# Patient Record
Sex: FEMALE | Race: BLACK OR AFRICAN AMERICAN | HISPANIC OR LATINO | URBAN - METROPOLITAN AREA
[De-identification: names, ages, dates, MRNs, and addresses within clinical notes are randomized per-mention and may not be internally consistent; named-entity substitution may affect disease eponyms.]

---

## 2024-03-24 ENCOUNTER — HOSPITAL ENCOUNTER (INPATIENT)
Facility: HOSPITAL | Age: 39
LOS: 2 days | Discharge: HOME | End: 2024-03-26
Attending: STUDENT IN AN ORGANIZED HEALTH CARE EDUCATION/TRAINING PROGRAM | Admitting: OBSTETRICS & GYNECOLOGY

## 2024-03-24 DIAGNOSIS — D62 ACUTE BLOOD LOSS ANEMIA: ICD-10-CM

## 2024-03-24 DIAGNOSIS — Z30.017 NEXPLANON INSERTION: ICD-10-CM

## 2024-03-24 DIAGNOSIS — E05.90 HYPERTHYROIDISM: ICD-10-CM

## 2024-03-24 LAB
ABO GROUP (TYPE) IN BLOOD: NORMAL
ALBUMIN SERPL BCP-MCNC: 3 G/DL (ref 3.4–5)
ALP SERPL-CCNC: 184 U/L (ref 33–110)
ALT SERPL W P-5'-P-CCNC: 6 U/L (ref 7–45)
ANION GAP BLDV CALCULATED.4IONS-SCNC: 12 MMOL/L (ref 10–25)
ANION GAP SERPL CALC-SCNC: 12 MMOL/L (ref 10–20)
ANTIBODY SCREEN: NORMAL
APTT PPP: 26 SECONDS (ref 27–38)
AST SERPL W P-5'-P-CCNC: 12 U/L (ref 9–39)
B2 GLYCOPROT1 IGA SER-ACNC: <0.6 U/ML
B2 GLYCOPROT1 IGG SER-ACNC: <1.4 U/ML
B2 GLYCOPROT1 IGM SER-ACNC: 0.2 U/ML
BASE EXCESS BLDV CALC-SCNC: -4.8 MMOL/L (ref -2–3)
BASOPHILS # BLD AUTO: 0.05 X10*3/UL (ref 0–0.1)
BASOPHILS NFR BLD AUTO: 0.2 %
BILIRUB SERPL-MCNC: 0.3 MG/DL (ref 0–1.2)
BODY TEMPERATURE: 37 DEGREES CELSIUS
BUN SERPL-MCNC: 7 MG/DL (ref 6–23)
CA-I BLDV-SCNC: 1.14 MMOL/L (ref 1.1–1.33)
CALCIUM SERPL-MCNC: 8.2 MG/DL (ref 8.6–10.6)
CARDIOLIPIN IGA SERPL-ACNC: 0.6 APL U/ML
CARDIOLIPIN IGG SER IA-ACNC: <1.6 GPL U/ML
CARDIOLIPIN IGM SER IA-ACNC: <0.2 MPL U/ML
CHLORIDE BLDV-SCNC: 104 MMOL/L (ref 98–107)
CHLORIDE SERPL-SCNC: 106 MMOL/L (ref 98–107)
CO2 SERPL-SCNC: 19 MMOL/L (ref 21–32)
CREAT SERPL-MCNC: 0.36 MG/DL (ref 0.5–1.05)
EGFRCR SERPLBLD CKD-EPI 2021: >90 ML/MIN/1.73M*2
EOSINOPHIL # BLD AUTO: 0.01 X10*3/UL (ref 0–0.7)
EOSINOPHIL NFR BLD AUTO: 0 %
ERYTHROCYTE [DISTWIDTH] IN BLOOD BY AUTOMATED COUNT: 13.6 % (ref 11.5–14.5)
EST. AVERAGE GLUCOSE BLD GHB EST-MCNC: 88 MG/DL
FIBRINOGEN PPP-MCNC: 431 MG/DL (ref 200–400)
GLUCOSE BLDV-MCNC: 116 MG/DL (ref 74–99)
GLUCOSE SERPL-MCNC: 115 MG/DL (ref 74–99)
HBA1C MFR BLD: 4.7 %
HBV SURFACE AB SER-ACNC: 204.1 MIU/ML
HBV SURFACE AG SERPL QL IA: NONREACTIVE
HCO3 BLDV-SCNC: 19.5 MMOL/L (ref 22–26)
HCT VFR BLD AUTO: 31.2 % (ref 36–46)
HCT VFR BLD EST: 35 % (ref 36–46)
HCV AB SER QL: NONREACTIVE
HGB BLD-MCNC: 11.3 G/DL (ref 12–16)
HGB BLDV-MCNC: 11.6 G/DL (ref 12–16)
HIV 1+2 AB+HIV1 P24 AG SERPL QL IA: NONREACTIVE
IMM GRANULOCYTES # BLD AUTO: 0.19 X10*3/UL (ref 0–0.7)
IMM GRANULOCYTES NFR BLD AUTO: 0.8 % (ref 0–0.9)
INR PPP: 1.1 (ref 0.9–1.1)
LACTATE BLDV-SCNC: 1.7 MMOL/L (ref 0.4–2)
LYMPHOCYTES # BLD AUTO: 0.94 X10*3/UL (ref 1.2–4.8)
LYMPHOCYTES NFR BLD AUTO: 3.8 %
MCH RBC QN AUTO: 29 PG (ref 26–34)
MCHC RBC AUTO-ENTMCNC: 36.2 G/DL (ref 32–36)
MCV RBC AUTO: 80 FL (ref 80–100)
MONOCYTES # BLD AUTO: 1.57 X10*3/UL (ref 0.1–1)
MONOCYTES NFR BLD AUTO: 6.4 %
NEUTROPHILS # BLD AUTO: 21.92 X10*3/UL (ref 1.2–7.7)
NEUTROPHILS NFR BLD AUTO: 88.8 %
NRBC BLD-RTO: 0 /100 WBCS (ref 0–0)
OXYHGB MFR BLDV: 44.2 % (ref 45–75)
PCO2 BLDV: 33 MM HG (ref 41–51)
PH BLDV: 7.38 PH (ref 7.33–7.43)
PLATELET # BLD AUTO: 214 X10*3/UL (ref 150–450)
PO2 BLDV: 29 MM HG (ref 35–45)
POTASSIUM BLDV-SCNC: 3.5 MMOL/L (ref 3.5–5.3)
POTASSIUM SERPL-SCNC: 3.4 MMOL/L (ref 3.5–5.3)
PROT SERPL-MCNC: 6.6 G/DL (ref 6.4–8.2)
PROTHROMBIN TIME: 12.1 SECONDS (ref 9.8–12.8)
RBC # BLD AUTO: 3.9 X10*6/UL (ref 4–5.2)
RH FACTOR (ANTIGEN D): NORMAL
RUBV IGG SERPL IA-ACNC: 1.4 IA
RUBV IGG SERPL QL IA: POSITIVE
SAO2 % BLDV: 45 % (ref 45–75)
SODIUM BLDV-SCNC: 132 MMOL/L (ref 136–145)
SODIUM SERPL-SCNC: 134 MMOL/L (ref 136–145)
T4 FREE SERPL-MCNC: 2.45 NG/DL (ref 0.78–1.48)
TREPONEMA PALLIDUM IGG+IGM AB [PRESENCE] IN SERUM OR PLASMA BY IMMUNOASSAY: NONREACTIVE
TSH SERPL-ACNC: <0.01 MIU/L (ref 0.44–3.98)
WBC # BLD AUTO: 24.7 X10*3/UL (ref 4.4–11.3)

## 2024-03-24 PROCEDURE — 2500000004 HC RX 250 GENERAL PHARMACY W/ HCPCS (ALT 636 FOR OP/ED): Performed by: STUDENT IN AN ORGANIZED HEALTH CARE EDUCATION/TRAINING PROGRAM

## 2024-03-24 PROCEDURE — 36415 COLL VENOUS BLD VENIPUNCTURE: CPT | Performed by: STUDENT IN AN ORGANIZED HEALTH CARE EDUCATION/TRAINING PROGRAM

## 2024-03-24 PROCEDURE — 99291 CRITICAL CARE FIRST HOUR: CPT | Performed by: STUDENT IN AN ORGANIZED HEALTH CARE EDUCATION/TRAINING PROGRAM

## 2024-03-24 PROCEDURE — 85613 RUSSELL VIPER VENOM DILUTED: CPT | Performed by: STUDENT IN AN ORGANIZED HEALTH CARE EDUCATION/TRAINING PROGRAM

## 2024-03-24 PROCEDURE — 88307 TISSUE EXAM BY PATHOLOGIST: CPT | Mod: TC,SUR | Performed by: STUDENT IN AN ORGANIZED HEALTH CARE EDUCATION/TRAINING PROGRAM

## 2024-03-24 PROCEDURE — 85025 COMPLETE CBC W/AUTO DIFF WBC: CPT | Performed by: STUDENT IN AN ORGANIZED HEALTH CARE EDUCATION/TRAINING PROGRAM

## 2024-03-24 PROCEDURE — 86901 BLOOD TYPING SEROLOGIC RH(D): CPT | Performed by: STUDENT IN AN ORGANIZED HEALTH CARE EDUCATION/TRAINING PROGRAM

## 2024-03-24 PROCEDURE — 96374 THER/PROPH/DIAG INJ IV PUSH: CPT

## 2024-03-24 PROCEDURE — 84439 ASSAY OF FREE THYROXINE: CPT | Performed by: STUDENT IN AN ORGANIZED HEALTH CARE EDUCATION/TRAINING PROGRAM

## 2024-03-24 PROCEDURE — 86803 HEPATITIS C AB TEST: CPT | Performed by: STUDENT IN AN ORGANIZED HEALTH CARE EDUCATION/TRAINING PROGRAM

## 2024-03-24 PROCEDURE — 87340 HEPATITIS B SURFACE AG IA: CPT | Performed by: STUDENT IN AN ORGANIZED HEALTH CARE EDUCATION/TRAINING PROGRAM

## 2024-03-24 PROCEDURE — 87389 HIV-1 AG W/HIV-1&-2 AB AG IA: CPT | Performed by: STUDENT IN AN ORGANIZED HEALTH CARE EDUCATION/TRAINING PROGRAM

## 2024-03-24 PROCEDURE — 85610 PROTHROMBIN TIME: CPT | Performed by: STUDENT IN AN ORGANIZED HEALTH CARE EDUCATION/TRAINING PROGRAM

## 2024-03-24 PROCEDURE — 86706 HEP B SURFACE ANTIBODY: CPT | Performed by: STUDENT IN AN ORGANIZED HEALTH CARE EDUCATION/TRAINING PROGRAM

## 2024-03-24 PROCEDURE — 83020 HEMOGLOBIN ELECTROPHORESIS: CPT | Performed by: STUDENT IN AN ORGANIZED HEALTH CARE EDUCATION/TRAINING PROGRAM

## 2024-03-24 PROCEDURE — 96372 THER/PROPH/DIAG INJ SC/IM: CPT | Performed by: STUDENT IN AN ORGANIZED HEALTH CARE EDUCATION/TRAINING PROGRAM

## 2024-03-24 PROCEDURE — 83036 HEMOGLOBIN GLYCOSYLATED A1C: CPT | Performed by: STUDENT IN AN ORGANIZED HEALTH CARE EDUCATION/TRAINING PROGRAM

## 2024-03-24 PROCEDURE — 88307 TISSUE EXAM BY PATHOLOGIST: CPT | Performed by: PATHOLOGY

## 2024-03-24 PROCEDURE — 85384 FIBRINOGEN ACTIVITY: CPT | Performed by: STUDENT IN AN ORGANIZED HEALTH CARE EDUCATION/TRAINING PROGRAM

## 2024-03-24 PROCEDURE — 86780 TREPONEMA PALLIDUM: CPT | Performed by: STUDENT IN AN ORGANIZED HEALTH CARE EDUCATION/TRAINING PROGRAM

## 2024-03-24 PROCEDURE — 1220000001 HC OB SEMI-PRIVATE ROOM DAILY

## 2024-03-24 PROCEDURE — 80053 COMPREHEN METABOLIC PANEL: CPT

## 2024-03-24 PROCEDURE — 84132 ASSAY OF SERUM POTASSIUM: CPT

## 2024-03-24 PROCEDURE — 86146 BETA-2 GLYCOPROTEIN ANTIBODY: CPT | Performed by: STUDENT IN AN ORGANIZED HEALTH CARE EDUCATION/TRAINING PROGRAM

## 2024-03-24 PROCEDURE — 83021 HEMOGLOBIN CHROMOTOGRAPHY: CPT | Performed by: STUDENT IN AN ORGANIZED HEALTH CARE EDUCATION/TRAINING PROGRAM

## 2024-03-24 PROCEDURE — 84443 ASSAY THYROID STIM HORMONE: CPT | Performed by: STUDENT IN AN ORGANIZED HEALTH CARE EDUCATION/TRAINING PROGRAM

## 2024-03-24 PROCEDURE — 86317 IMMUNOASSAY INFECTIOUS AGENT: CPT | Performed by: STUDENT IN AN ORGANIZED HEALTH CARE EDUCATION/TRAINING PROGRAM

## 2024-03-24 PROCEDURE — 84132 ASSAY OF SERUM POTASSIUM: CPT | Performed by: STUDENT IN AN ORGANIZED HEALTH CARE EDUCATION/TRAINING PROGRAM

## 2024-03-24 PROCEDURE — 2500000001 HC RX 250 WO HCPCS SELF ADMINISTERED DRUGS (ALT 637 FOR MEDICARE OP): Performed by: STUDENT IN AN ORGANIZED HEALTH CARE EDUCATION/TRAINING PROGRAM

## 2024-03-24 PROCEDURE — 96372 THER/PROPH/DIAG INJ SC/IM: CPT

## 2024-03-24 PROCEDURE — 7100000016 HC LABOR RECOVERY PER HOUR

## 2024-03-24 PROCEDURE — 59414 DELIVER PLACENTA: CPT | Performed by: STUDENT IN AN ORGANIZED HEALTH CARE EDUCATION/TRAINING PROGRAM

## 2024-03-24 PROCEDURE — 86147 CARDIOLIPIN ANTIBODY EA IG: CPT | Performed by: STUDENT IN AN ORGANIZED HEALTH CARE EDUCATION/TRAINING PROGRAM

## 2024-03-24 RX ORDER — ACETAMINOPHEN 325 MG/1
975 TABLET ORAL EVERY 6 HOURS
Status: DISCONTINUED | OUTPATIENT
Start: 2024-03-24 | End: 2024-03-26 | Stop reason: HOSPADM

## 2024-03-24 RX ORDER — ONDANSETRON HYDROCHLORIDE 2 MG/ML
4 INJECTION, SOLUTION INTRAVENOUS EVERY 6 HOURS PRN
Status: DISCONTINUED | OUTPATIENT
Start: 2024-03-24 | End: 2024-03-26 | Stop reason: HOSPADM

## 2024-03-24 RX ORDER — OXYTOCIN 10 [USP'U]/ML
10 INJECTION, SOLUTION INTRAMUSCULAR; INTRAVENOUS ONCE
Status: COMPLETED | OUTPATIENT
Start: 2024-03-24 | End: 2024-03-24

## 2024-03-24 RX ORDER — TRANEXAMIC ACID 100 MG/ML
1000 INJECTION, SOLUTION INTRAVENOUS ONCE AS NEEDED
Status: DISCONTINUED | OUTPATIENT
Start: 2024-03-24 | End: 2024-03-26 | Stop reason: HOSPADM

## 2024-03-24 RX ORDER — DIPHENHYDRAMINE HCL 25 MG
25 CAPSULE ORAL EVERY 6 HOURS PRN
Status: DISCONTINUED | OUTPATIENT
Start: 2024-03-24 | End: 2024-03-26 | Stop reason: HOSPADM

## 2024-03-24 RX ORDER — OXYTOCIN 10 [USP'U]/ML
10 INJECTION, SOLUTION INTRAMUSCULAR; INTRAVENOUS ONCE AS NEEDED
Status: DISCONTINUED | OUTPATIENT
Start: 2024-03-24 | End: 2024-03-26 | Stop reason: HOSPADM

## 2024-03-24 RX ORDER — HYDROMORPHONE HYDROCHLORIDE 1 MG/ML
0.5 INJECTION, SOLUTION INTRAMUSCULAR; INTRAVENOUS; SUBCUTANEOUS ONCE
Status: DISCONTINUED | OUTPATIENT
Start: 2024-03-24 | End: 2024-03-24

## 2024-03-24 RX ORDER — ONDANSETRON 4 MG/1
4 TABLET, FILM COATED ORAL EVERY 6 HOURS PRN
Status: DISCONTINUED | OUTPATIENT
Start: 2024-03-24 | End: 2024-03-26 | Stop reason: HOSPADM

## 2024-03-24 RX ORDER — SIMETHICONE 80 MG
80 TABLET,CHEWABLE ORAL 4 TIMES DAILY PRN
Status: DISCONTINUED | OUTPATIENT
Start: 2024-03-24 | End: 2024-03-26 | Stop reason: HOSPADM

## 2024-03-24 RX ORDER — ADHESIVE BANDAGE
10 BANDAGE TOPICAL
Status: DISCONTINUED | OUTPATIENT
Start: 2024-03-24 | End: 2024-03-26 | Stop reason: HOSPADM

## 2024-03-24 RX ORDER — IBUPROFEN 600 MG/1
600 TABLET ORAL EVERY 6 HOURS
Status: DISCONTINUED | OUTPATIENT
Start: 2024-03-24 | End: 2024-03-26 | Stop reason: HOSPADM

## 2024-03-24 RX ORDER — MISOPROSTOL 200 UG/1
800 TABLET ORAL ONCE AS NEEDED
Status: DISCONTINUED | OUTPATIENT
Start: 2024-03-24 | End: 2024-03-26 | Stop reason: HOSPADM

## 2024-03-24 RX ORDER — NIFEDIPINE 10 MG/1
10 CAPSULE ORAL ONCE AS NEEDED
Status: DISCONTINUED | OUTPATIENT
Start: 2024-03-24 | End: 2024-03-26 | Stop reason: HOSPADM

## 2024-03-24 RX ORDER — KETOROLAC TROMETHAMINE 30 MG/ML
15 INJECTION, SOLUTION INTRAMUSCULAR; INTRAVENOUS EVERY 6 HOURS SCHEDULED
Status: COMPLETED | OUTPATIENT
Start: 2024-03-24 | End: 2024-03-25

## 2024-03-24 RX ORDER — METHIMAZOLE 10 MG/1
10 TABLET ORAL
COMMUNITY
Start: 2020-03-18 | End: 2024-03-26 | Stop reason: HOSPADM

## 2024-03-24 RX ORDER — OXYTOCIN/0.9 % SODIUM CHLORIDE 30/500 ML
60 PLASTIC BAG, INJECTION (ML) INTRAVENOUS ONCE AS NEEDED
Status: DISCONTINUED | OUTPATIENT
Start: 2024-03-24 | End: 2024-03-26 | Stop reason: HOSPADM

## 2024-03-24 RX ORDER — CARBOPROST TROMETHAMINE 250 UG/ML
250 INJECTION, SOLUTION INTRAMUSCULAR ONCE AS NEEDED
Status: DISCONTINUED | OUTPATIENT
Start: 2024-03-24 | End: 2024-03-26 | Stop reason: HOSPADM

## 2024-03-24 RX ORDER — DIPHENHYDRAMINE HYDROCHLORIDE 50 MG/ML
25 INJECTION INTRAMUSCULAR; INTRAVENOUS EVERY 6 HOURS PRN
Status: DISCONTINUED | OUTPATIENT
Start: 2024-03-24 | End: 2024-03-26 | Stop reason: HOSPADM

## 2024-03-24 RX ORDER — METHYLERGONOVINE MALEATE 0.2 MG/ML
0.2 INJECTION INTRAVENOUS ONCE AS NEEDED
Status: DISCONTINUED | OUTPATIENT
Start: 2024-03-24 | End: 2024-03-26 | Stop reason: HOSPADM

## 2024-03-24 RX ORDER — LOPERAMIDE HYDROCHLORIDE 2 MG/1
4 CAPSULE ORAL EVERY 2 HOUR PRN
Status: DISCONTINUED | OUTPATIENT
Start: 2024-03-24 | End: 2024-03-26 | Stop reason: HOSPADM

## 2024-03-24 RX ORDER — OXYCODONE HYDROCHLORIDE 5 MG/1
5 TABLET ORAL ONCE
Status: COMPLETED | OUTPATIENT
Start: 2024-03-24 | End: 2024-03-24

## 2024-03-24 RX ORDER — GENTAMICIN 40 MG/ML
INJECTION, SOLUTION INTRAMUSCULAR; INTRAVENOUS
Status: DISPENSED
Start: 2024-03-24 | End: 2024-03-24

## 2024-03-24 RX ORDER — POLYETHYLENE GLYCOL 3350 17 G/17G
17 POWDER, FOR SOLUTION ORAL 2 TIMES DAILY PRN
Status: DISCONTINUED | OUTPATIENT
Start: 2024-03-24 | End: 2024-03-26 | Stop reason: HOSPADM

## 2024-03-24 RX ORDER — LIDOCAINE 560 MG/1
1 PATCH PERCUTANEOUS; TOPICAL; TRANSDERMAL
Status: DISCONTINUED | OUTPATIENT
Start: 2024-03-24 | End: 2024-03-26 | Stop reason: HOSPADM

## 2024-03-24 RX ORDER — HYDRALAZINE HYDROCHLORIDE 20 MG/ML
5 INJECTION INTRAMUSCULAR; INTRAVENOUS ONCE AS NEEDED
Status: DISCONTINUED | OUTPATIENT
Start: 2024-03-24 | End: 2024-03-26 | Stop reason: HOSPADM

## 2024-03-24 RX ORDER — LABETALOL HYDROCHLORIDE 5 MG/ML
20 INJECTION, SOLUTION INTRAVENOUS ONCE AS NEEDED
Status: DISCONTINUED | OUTPATIENT
Start: 2024-03-24 | End: 2024-03-26 | Stop reason: HOSPADM

## 2024-03-24 RX ORDER — BISACODYL 10 MG/1
10 SUPPOSITORY RECTAL DAILY PRN
Status: DISCONTINUED | OUTPATIENT
Start: 2024-03-24 | End: 2024-03-26 | Stop reason: HOSPADM

## 2024-03-24 RX ADMIN — AMPICILLIN SODIUM 2 G: 2 INJECTION, POWDER, FOR SOLUTION INTRAMUSCULAR; INTRAVENOUS at 10:24

## 2024-03-24 RX ADMIN — ACETAMINOPHEN 975 MG: 325 TABLET ORAL at 22:53

## 2024-03-24 RX ADMIN — GENTAMICIN SULFATE 400 MG: 40 INJECTION, SOLUTION INTRAMUSCULAR; INTRAVENOUS at 11:05

## 2024-03-24 RX ADMIN — AMPICILLIN SODIUM 2 G: 2 INJECTION, POWDER, FOR SOLUTION INTRAMUSCULAR; INTRAVENOUS at 22:53

## 2024-03-24 RX ADMIN — ACETAMINOPHEN 975 MG: 325 TABLET ORAL at 16:03

## 2024-03-24 RX ADMIN — OXYCODONE HYDROCHLORIDE 5 MG: 5 TABLET ORAL at 12:44

## 2024-03-24 RX ADMIN — KETOROLAC TROMETHAMINE 15 MG: 30 INJECTION, SOLUTION INTRAMUSCULAR; INTRAVENOUS at 22:53

## 2024-03-24 RX ADMIN — KETOROLAC TROMETHAMINE 15 MG: 30 INJECTION, SOLUTION INTRAMUSCULAR; INTRAVENOUS at 16:03

## 2024-03-24 RX ADMIN — AMPICILLIN SODIUM 2 G: 2 INJECTION, POWDER, FOR SOLUTION INTRAMUSCULAR; INTRAVENOUS at 16:03

## 2024-03-24 RX ADMIN — ACETAMINOPHEN 975 MG: 325 TABLET ORAL at 09:55

## 2024-03-24 RX ADMIN — OXYTOCIN 10 UNITS: 10 INJECTION INTRAVENOUS at 07:48

## 2024-03-24 RX ADMIN — IBUPROFEN 600 MG: 600 TABLET, FILM COATED ORAL at 09:55

## 2024-03-24 SDOH — SOCIAL STABILITY: SOCIAL INSECURITY: ABUSE SCREEN: ADULT

## 2024-03-24 SDOH — SOCIAL STABILITY: SOCIAL INSECURITY: DO YOU FEEL ANYONE HAS EXPLOITED OR TAKEN ADVANTAGE OF YOU FINANCIALLY OR OF YOUR PERSONAL PROPERTY?: NO

## 2024-03-24 SDOH — HEALTH STABILITY: MENTAL HEALTH: NON-SPECIFIC ACTIVE SUICIDAL THOUGHTS (PAST 1 MONTH): NO

## 2024-03-24 SDOH — SOCIAL STABILITY: SOCIAL INSECURITY: HAVE YOU HAD THOUGHTS OF HARMING ANYONE ELSE?: NO

## 2024-03-24 SDOH — HEALTH STABILITY: MENTAL HEALTH: HAVE YOU USED ANY SUBSTANCES (CANABIS, COCAINE, HEROIN, HALLUCINOGENS, INHALANTS, ETC.) IN THE PAST 12 MONTHS?: NO

## 2024-03-24 SDOH — SOCIAL STABILITY: SOCIAL INSECURITY: ARE YOU OR HAVE YOU BEEN THREATENED OR ABUSED PHYSICALLY, EMOTIONALLY, OR SEXUALLY BY ANYONE?: NO

## 2024-03-24 SDOH — SOCIAL STABILITY: SOCIAL INSECURITY: DOES ANYONE TRY TO KEEP YOU FROM HAVING/CONTACTING OTHER FRIENDS OR DOING THINGS OUTSIDE YOUR HOME?: NO

## 2024-03-24 SDOH — SOCIAL STABILITY: SOCIAL INSECURITY: VERBAL ABUSE: DENIES

## 2024-03-24 SDOH — SOCIAL STABILITY: SOCIAL INSECURITY: HAS ANYONE EVER THREATENED TO HURT YOUR FAMILY OR YOUR PETS?: NO

## 2024-03-24 SDOH — SOCIAL STABILITY: SOCIAL INSECURITY: ARE THERE ANY APPARENT SIGNS OF INJURIES/BEHAVIORS THAT COULD BE RELATED TO ABUSE/NEGLECT?: NO

## 2024-03-24 SDOH — HEALTH STABILITY: MENTAL HEALTH: HAVE YOU USED ANY PRESCRIPTION DRUGS OTHER THAN PRESCRIBED IN THE PAST 12 MONTHS?: NO

## 2024-03-24 SDOH — HEALTH STABILITY: MENTAL HEALTH: SUICIDAL BEHAVIOR (LIFETIME): NO

## 2024-03-24 SDOH — HEALTH STABILITY: MENTAL HEALTH: WISH TO BE DEAD (PAST 1 MONTH): NO

## 2024-03-24 SDOH — SOCIAL STABILITY: SOCIAL INSECURITY: PHYSICAL ABUSE: DENIES

## 2024-03-24 SDOH — ECONOMIC STABILITY: HOUSING INSECURITY: DO YOU FEEL UNSAFE GOING BACK TO THE PLACE WHERE YOU ARE LIVING?: NO

## 2024-03-24 ASSESSMENT — PAIN DESCRIPTION - DESCRIPTORS
DESCRIPTORS: CRAMPING
DESCRIPTORS: CRAMPING;ACHING;SORE
DESCRIPTORS: CRAMPING

## 2024-03-24 ASSESSMENT — LIFESTYLE VARIABLES
HOW MANY STANDARD DRINKS CONTAINING ALCOHOL DO YOU HAVE ON A TYPICAL DAY: 1 OR 2
HOW OFTEN DO YOU HAVE A DRINK CONTAINING ALCOHOL: 2-4 TIMES A MONTH
HAVE YOU EVER FELT YOU SHOULD CUT DOWN ON YOUR DRINKING: NO
EVER FELT BAD OR GUILTY ABOUT YOUR DRINKING: NO
SKIP TO QUESTIONS 9-10: 1
AUDIT-C TOTAL SCORE: 2
HOW OFTEN DO YOU HAVE 6 OR MORE DRINKS ON ONE OCCASION: NEVER
TOTAL SCORE: 0
EVER HAD A DRINK FIRST THING IN THE MORNING TO STEADY YOUR NERVES TO GET RID OF A HANGOVER: NO
HAVE PEOPLE ANNOYED YOU BY CRITICIZING YOUR DRINKING: NO
AUDIT-C TOTAL SCORE: 2

## 2024-03-24 ASSESSMENT — ACTIVITIES OF DAILY LIVING (ADL): LACK_OF_TRANSPORTATION: NO

## 2024-03-24 ASSESSMENT — PATIENT HEALTH QUESTIONNAIRE - PHQ9
SUM OF ALL RESPONSES TO PHQ9 QUESTIONS 1 & 2: 0
2. FEELING DOWN, DEPRESSED OR HOPELESS: NOT AT ALL
1. LITTLE INTEREST OR PLEASURE IN DOING THINGS: NOT AT ALL

## 2024-03-24 ASSESSMENT — PAIN DESCRIPTION - LOCATION
LOCATION: ABDOMEN
LOCATION: ABDOMEN

## 2024-03-24 ASSESSMENT — PAIN SCALES - GENERAL
PAINLEVEL_OUTOF10: 10 - WORST POSSIBLE PAIN
PAINLEVEL_OUTOF10: 3
PAINLEVEL_OUTOF10: 8
PAINLEVEL_OUTOF10: 5 - MODERATE PAIN
PAINLEVEL_OUTOF10: 8
PAINLEVEL_OUTOF10: 8
PAINLEVEL_OUTOF10: 9
PAINLEVEL_OUTOF10: 10 - WORST POSSIBLE PAIN
PAINLEVEL_OUTOF10: 5 - MODERATE PAIN
PAINLEVEL_OUTOF10: 6
PAINLEVEL_OUTOF10: 5 - MODERATE PAIN

## 2024-03-24 ASSESSMENT — PAIN DESCRIPTION - ORIENTATION: ORIENTATION: LOWER

## 2024-03-24 ASSESSMENT — COLUMBIA-SUICIDE SEVERITY RATING SCALE - C-SSRS
6. HAVE YOU EVER DONE ANYTHING, STARTED TO DO ANYTHING, OR PREPARED TO DO ANYTHING TO END YOUR LIFE?: NO
2. HAVE YOU ACTUALLY HAD ANY THOUGHTS OF KILLING YOURSELF?: NO
1. IN THE PAST MONTH, HAVE YOU WISHED YOU WERE DEAD OR WISHED YOU COULD GO TO SLEEP AND NOT WAKE UP?: NO

## 2024-03-24 ASSESSMENT — PAIN - FUNCTIONAL ASSESSMENT: PAIN_FUNCTIONAL_ASSESSMENT: 0-10

## 2024-03-24 NOTE — ED TRIAGE NOTES
Pt presents to the ED after delivering a baby at home at approximately 0700.  Pt was unaware of pregnancy.  Pt last reported menstrual cycle was 2 months ago.

## 2024-03-24 NOTE — H&P
Obstetrical Admission History and Physical     Kassie Apple is a 38 y.o. . Admitted after vaginal delivery at home at unknown gestational age.    Chief Complaint: home delivery    Assessment/Plan    S/p vaginal delivery  -  in field, placenta delivered in ED. EBL after arrival approx 100cc  - Placenta appeared thickened, discolored and infected, and with clot. Sent to path  -  with cardiopulm arrest, ROSC achieved after approx 1h of life, to NICU. Given initial concern for demise, sent fetal demise labs  - NPNC, prenatal labs sent    Suspected IAI  - Afebrile, but fundal tenderness at delivery and placenta appeared infected upon delivery  - Started on amp/gent, continue for 24h given unknown duration of infection and elevated WBC count    Elevated BP  - Severe range BP x2 not sustained, not 4hrs apart  - Continue to monitor  - Asx  - HELLP labs neg on admission    Hyperthyroidism  - Endo note from 2019 states Graves disease w history of diffuse goiter and thyrotoxicosis  - Was on methimazole 10 daily, will restart  - T4 2.45, TSH <0.01 today  - TRAB, TSI sent    Seen and d/w Dr. Vasquez Fay MD  PGY-3, Obstetrics and Gynecology    Subjective   Kassie Apple is a 38 y.o. G6 now  presented via EMS after vaginal delivery at home. Unknown gestational age, patient did not know she was pregnant. Did not get prenatal care this pregnancy. Reports possible ROM yesterday - gush of bloody fluid, which she thought was her period. Has been having bleeding episodes during pregnancy which she thought was her period. Started to have abd pain this AM, thought it was constipation, pushed, and delivered.  did not cry. Pt then called EMS.    On arrival to ED, placenta had not yet delivered. Upon delivery of placenta, noted to be thickened, discolored, and had a large clot.    Pt reporting significant abd pain in ED. Also reporting chills. No lightheadedness, fevers at home, recent  urinary or GI sx.    Reports hx hyperthyroidism, on methimazole 10 daily, has not taken in few months. Reports hx heart murmur        Obstetrical History   OB History    Para Term  AB Living   6 6 4 2   6   SAB IAB Ectopic Multiple Live Births           6      # Outcome Date GA Lbr Destin/2nd Weight Sex Delivery Anes PTL Lv   6  24    M Vag-Spont None Y PHILLY   5  21    F CS-LTranv CSE  PHILLY   4 Term 02/03/15    F Vag-Spont EPI N PHILLY   3 Term 11    F Vag-Spont EPI N PHILLY   2 Term 02/02/10    F Vag-Spont EPI N PHILLY   1 Term 08    M Vag-Spont EPI N PHILLY       Past Medical History  Hyperthyroidism (endo note from 2019 states Graves disease w history of diffuse goiter and thyrotoxicosis)  Heart murmur  Congenital pulmonary valve anomaly    Past Surgical History   CS x1    Social History  Smokes socially  Drinks wine socially  Denies other drug use    Allergies  Patient has no known allergies.     Medications  Medications Prior to Admission   Medication Sig Dispense Refill Last Dose    methIMAzole (Tapazole) 10 mg tablet Take 1 tablet (10 mg) by mouth once daily.   Past Month       Objective    Last Vitals  Temp Pulse Resp BP MAP O2 Sat   37.7 °C (99.9 °F) 88 18 (!) 143/69   97 %     Physical Examination  Physical Exam  Constitutional:       Appearance: Normal appearance.   HENT:      Head: Normocephalic and atraumatic.      Nose: Nose normal.      Mouth/Throat:      Mouth: Mucous membranes are moist.   Eyes:      Extraocular Movements: Extraocular movements intact.   Pulmonary:      Effort: Pulmonary effort is normal.   Abdominal:      Palpations: Abdomen is soft.      Tenderness: There is abdominal tenderness (diffuse lower abdominal and fundal tenderness).      Comments: Fundus firm below umbilicus after delivery of placenta. Minimal trickle   Musculoskeletal:         General: Normal range of motion.      Cervical back: Normal range of motion.   Skin:     General: Skin is  warm and dry.   Neurological:      General: No focal deficit present.      Mental Status: She is alert and oriented to person, place, and time.   Psychiatric:         Mood and Affect: Mood normal.         Behavior: Behavior normal.       Lab Review  Labs in chart were reviewed.  Lab Results   Component Value Date    WBC 24.7 (H) 03/24/2024    HGB 11.3 (L) 03/24/2024    HCT 31.2 (L) 03/24/2024     03/24/2024     Lab Results   Component Value Date    GLUCOSE 115 (H) 03/24/2024     (L) 03/24/2024    K 3.4 (L) 03/24/2024     03/24/2024    CO2 19 (L) 03/24/2024    ANIONGAP 12 03/24/2024    BUN 7 03/24/2024    CREATININE 0.36 (L) 03/24/2024    EGFR >90 03/24/2024    CALCIUM 8.2 (L) 03/24/2024    ALBUMIN 3.0 (L) 03/24/2024    PROT 6.6 03/24/2024    ALKPHOS 184 (H) 03/24/2024    ALT 6 (L) 03/24/2024    AST 12 03/24/2024    BILITOT 0.3 03/24/2024

## 2024-03-24 NOTE — ED PROCEDURE NOTE
Procedure  Critical Care    Performed by: Latrice Tovar MD  Authorized by: Latrice Tovar MD    Critical care provider statement:     Critical care time (minutes):  30    Critical care time was exclusive of:  Teaching time and separately billable procedures and treating other patients    Critical care was necessary to treat or prevent imminent or life-threatening deterioration of the following conditions: precipitous delivery.    Critical care was time spent personally by me on the following activities:  Ordering and performing treatments and interventions, pulse oximetry, re-evaluation of patient's condition, review of old charts, examination of patient, evaluation of patient's response to treatment, discussions with consultants, development of treatment plan with patient or surrogate, blood draw for specimens and ordering and review of laboratory studies    Care discussed with: admitting provider                 Latrice Tovar MD  03/24/24 7618

## 2024-03-24 NOTE — DISCHARGE INSTRUCTIONS
A few days after your discharge, one of our care coordinators may be calling you to see how things have been going at home. This phone call also gives you the opportunity to clarify any information on your discharge instructions or ask any questions that may have come up since leaving the hospital.

## 2024-03-24 NOTE — L&D DELIVERY NOTE
OB Delivery Note  3/24/2024  Kassie Apple  38 y.o.   Vaginal, Spontaneous      Patient presented to ED after  at home. Placenta delivered in ED. No lacs. EBL after arrival 100cc.  Inspection of placenta suggestive of abruption involving 2/3 of placenta.  Foul odor consistent with IAI.    Gestational Age: Unknown  /Para:   Quantitative Blood Loss: Admission to Discharge: 25 mL (3/24/2024  7:39 AM - 3/24/2024  5:13 PM)    Ghulam Apple [[id number]]      Labor Events     labor?: Yes  Sac identifier: Sac 1  Labor type: Spontaneous Onset of Labor  Augmentation: None  Complications: Chorioamnionitis       Placenta    Placenta delivery date/time: 3/24/2024 0739  Placenta removal: Spontaneous  Placenta appearance: Intact  Placenta disposition: pathology       Cord    Vessels: 3 vessels  Complications: None  Gases sent?: Yes       Lacerations    Episiotomy: None  Perineal laceration: None  Repair suture: None       Anesthesia    Method: None       Operative Delivery    Forceps attempted?: No  Vacuum extractor attempted?: No       Shoulder Dystocia    Shoulder dystocia present?: No       [place] Delivery    Birth date/time:   Delivery type: Vaginal, Spontaneous  Complications: Chorioamnionitis       Resuscitation    Method: Chest compressions, Epinephrine, Tactile stimulation, Endotracheal Intubation, Positive pressure ventilation (PPV)       Apgars    Living status: Living  Apgar Component Scores:  1 min.:  5 min.:  10 min.:  15 min.:  20 min.:    Skin color:         Heart rate:         Reflex irritability:         Muscle tone:         Respiratory effort:         Total:                Delivery Providers    Delivering clinician:    Provider Role     Delivery Nurse     Nursery Nurse     Resident                 I was present for the entirety of the procedure(s).     Ke Ovalle MD

## 2024-03-24 NOTE — DISCHARGE INSTR - AVS FIRST PAGE
Any woman can have complications after a birth including a blood clot, a heart problem, hypertensive disorder/eclampsia, depression, hemorrhage, or infection. Notify all providers of your delivery date up to one year after birth.*       Call 911 or go to nearest emergency room right away if you have:   PAIN or pressure in chest;   OBSTRUCTED breathing or shortness of breath;   SEIZURES;   THOUGHTS of hurting yourself or someone else; heart palpitations/racing; change in alertness/confusion.    Call your provider if you have:   BLEEDING, soaking through a pad/hour, or blood clots the size of an egg or bigger;   INCISION (episiotomy stitches or  site) that is not healing (increased redness, pain, drainage/pus, or separation) if you had one;   RED or swollen leg/calf that is painful or warm to touch, especially in one leg more than the other;   TEMPERATURE of 100.4 F or higher or chills;   HEADACHE that does not get better with medicine, rest or hydration, or bad headache with vision changes   like spots or flashing lights; increased swelling of face, hands or legs; severe cramps or upper right belly pain; red or swollen breast that is painful or warm to touch; an unusual, foul odor from your vaginal discharge; pain, burning, or difficulty during urination; severe constipation (more than 5 days); feelings of depression (such as depressed mood, loss of interest in enjoyable things, unable to care for yourself, trouble sleeping, lack of appetite, or feeling worthless).     If you can't reach your provider or symptoms worsen, call 911 or go to nearest emergency room.   *Information obtained from [employer]: Save Your Life: Get Care for These POST-BIRTH Warning Signs/s

## 2024-03-24 NOTE — ED PROVIDER NOTES
CC: home delivery     HPI:  Patient is a 38-year-old female G8, P5 presenting to the emergency department after delivery of an unknown gestational age fetus in the field.  Baby was born pulseless and apneic and resuscitation was initiated by EMS.  Mom has not yet delivered placenta.  She reports lower abdominal pain, no other symptoms at this time.  OB is at bedside to assist in evaluation.  Please defer to their history and physical for further information.    Records Reviewed:  Recent available ED and inpatient notes reviewed in EMR.    PMHx/PSHx:  Per HPI.   - has no past medical history on file.  - has no past surgical history on file.    Medications:  Reviewed in EMR. See EMR for complete list of medications and doses.    Allergies:  Patient has no allergy information on record.    Social History:  - Tobacco:  has no history on file for tobacco use.   - Alcohol:  has no history on file for alcohol use.   - Illicit Drugs:  has no history on file for drug use.     ROS:  Per HPI.       ???????????????????????????????????????????????????????????????  Triage Vitals:  T 37.7 °C (99.9 °F)  HR 83  /64  RR 18  O2 100 % None (Room air)    Physical Exam  Vitals and nursing note reviewed.   Constitutional:       Appearance: Normal appearance.   HENT:      Head: Normocephalic and atraumatic.      Nose: Nose normal.      Mouth/Throat:      Pharynx: Oropharynx is clear.   Eyes:      Extraocular Movements: Extraocular movements intact.      Pupils: Pupils are equal, round, and reactive to light.   Cardiovascular:      Rate and Rhythm: Normal rate and regular rhythm.   Pulmonary:      Effort: Pulmonary effort is normal.      Breath sounds: Normal breath sounds.   Genitourinary:     Comments: Vaginal bleeding, cord present  Musculoskeletal:         General: No swelling or deformity. Normal range of motion.      Right lower leg: No edema.      Left lower leg: No edema.   Skin:     General: Skin is warm and dry.       Capillary Refill: Capillary refill takes less than 2 seconds.   Neurological:      General: No focal deficit present.      Mental Status: She is alert and oriented to person, place, and time.   Psychiatric:         Mood and Affect: Mood normal.         Behavior: Behavior normal.         ???????????????????????????????????????????????????????????????    Assessment and Plan:  Patient is a 38-year-old female presenting to the emergency department by EMS after delivery of a apneic and pulseless child of unknown gestational age at home.  Mom did not know that she was pregnant, received no prenatal care.  She is hemodynamically stable on arrival, obstetrics team is at bedside to manage third stage of labor including delivery of placenta.  Placenta is delivered reported to be intact minimal postpartum bleeding no significant lacerations noted by OB team, patient transition to OB care.  While in the emergency department patient received 10 units of IM Pitocin, basic labs were drawn and not resulted, mother transported to labor and delivery in stable condition.    ED Course:  Diagnoses as of 03/24/24 0813   Encounter for postpartum care after unplanned out of hospital delivery        Social Determinants Limiting Care:      Disposition:  Transport to labor and delivery    Flako Simpson MD       Procedures ? [employer] last updated 3/24/2024 8:13 AM        Flako Simpson MD  Resident  03/24/24 0813

## 2024-03-25 ENCOUNTER — DOCUMENTATION (OUTPATIENT)
Dept: CASE MANAGEMENT | Facility: HOSPITAL | Age: 39
End: 2024-03-25

## 2024-03-25 PROBLEM — E83.42 HYPOMAGNESEMIA: Status: ACTIVE | Noted: 2024-03-25

## 2024-03-25 PROBLEM — Z82.79 FAMILY HISTORY OF DOWNS SYNDROME: Status: ACTIVE | Noted: 2020-10-07

## 2024-03-25 PROBLEM — O41.1290 CHORIOAMNIONITIS, DELIVERED, CURRENT HOSPITALIZATION (HHS-HCC): Status: ACTIVE | Noted: 2024-03-25

## 2024-03-25 PROBLEM — E87.6 HYPOKALEMIA: Status: ACTIVE | Noted: 2024-03-25

## 2024-03-25 PROBLEM — E05.90 HYPERTHYROIDISM: Status: ACTIVE | Noted: 2020-03-16

## 2024-03-25 PROBLEM — E05.00 GRAVES DISEASE: Status: ACTIVE | Noted: 2024-03-25

## 2024-03-25 LAB
DRVVT SCREEN TO CONFIRM RATIO: 0.9 RATIO
DRVVT/DRVVT CFM NRMLZD PPP-RTO: 1.01 RATIO
DRVVT/DRVVT CFM P DOAC NEUT NORM PPP-RTO: 0.89 RATIO
ERYTHROCYTE [DISTWIDTH] IN BLOOD BY AUTOMATED COUNT: 14.5 % (ref 11.5–14.5)
HCT VFR BLD AUTO: 25.7 % (ref 36–52)
HEMOGLOBIN A2: 3.5 % (ref 2–3.5)
HEMOGLOBIN A: 94.8 % (ref 95.8–98)
HEMOGLOBIN F: 1.7 % (ref 0–2)
HEMOGLOBIN IDENTIFICATION INTERPRETATION: NORMAL
HGB BLD-MCNC: 8.7 G/DL (ref 12–17.5)
LA 2 SCREEN W REFLEX-IMP: NORMAL
MCH RBC QN AUTO: 28.8 PG (ref 26–34)
MCHC RBC AUTO-ENTMCNC: 33.9 G/DL (ref 32–36)
MCV RBC AUTO: 85 FL (ref 80–100)
NORMALIZED SCT PPP-RTO: 0.64 RATIO
NRBC BLD-RTO: 0 /100 WBCS (ref 0–0)
PATH REVIEW-HGB IDENTIFICATION: ABNORMAL
PLATELET # BLD AUTO: 156 X10*3/UL (ref 150–450)
RBC # BLD AUTO: 3.02 X10*6/UL (ref 4–5.9)
SILICA CLOTTING TIME CONFIRMATION: 1.04 RATIO
SILICA CLOTTING TIME SCREEN: 0.67 RATIO
WBC # BLD AUTO: 9.7 X10*3/UL (ref 4.4–11.3)

## 2024-03-25 PROCEDURE — 87661 TRICHOMONAS VAGINALIS AMPLIF: CPT | Performed by: STUDENT IN AN ORGANIZED HEALTH CARE EDUCATION/TRAINING PROGRAM

## 2024-03-25 PROCEDURE — 36415 COLL VENOUS BLD VENIPUNCTURE: CPT | Performed by: STUDENT IN AN ORGANIZED HEALTH CARE EDUCATION/TRAINING PROGRAM

## 2024-03-25 PROCEDURE — 2500000005 HC RX 250 GENERAL PHARMACY W/O HCPCS: Performed by: STUDENT IN AN ORGANIZED HEALTH CARE EDUCATION/TRAINING PROGRAM

## 2024-03-25 PROCEDURE — 2500000001 HC RX 250 WO HCPCS SELF ADMINISTERED DRUGS (ALT 637 FOR MEDICARE OP): Performed by: STUDENT IN AN ORGANIZED HEALTH CARE EDUCATION/TRAINING PROGRAM

## 2024-03-25 PROCEDURE — 2500000004 HC RX 250 GENERAL PHARMACY W/ HCPCS (ALT 636 FOR OP/ED): Performed by: NURSE PRACTITIONER

## 2024-03-25 PROCEDURE — 2500000004 HC RX 250 GENERAL PHARMACY W/ HCPCS (ALT 636 FOR OP/ED): Performed by: STUDENT IN AN ORGANIZED HEALTH CARE EDUCATION/TRAINING PROGRAM

## 2024-03-25 PROCEDURE — 2500000001 HC RX 250 WO HCPCS SELF ADMINISTERED DRUGS (ALT 637 FOR MEDICARE OP): Performed by: NURSE PRACTITIONER

## 2024-03-25 PROCEDURE — 2500000002 HC RX 250 W HCPCS SELF ADMINISTERED DRUGS (ALT 637 FOR MEDICARE OP, ALT 636 FOR OP/ED): Performed by: NURSE PRACTITIONER

## 2024-03-25 PROCEDURE — 83520 IMMUNOASSAY QUANT NOS NONAB: CPT | Performed by: STUDENT IN AN ORGANIZED HEALTH CARE EDUCATION/TRAINING PROGRAM

## 2024-03-25 PROCEDURE — 85027 COMPLETE CBC AUTOMATED: CPT | Performed by: STUDENT IN AN ORGANIZED HEALTH CARE EDUCATION/TRAINING PROGRAM

## 2024-03-25 PROCEDURE — 1220000001 HC OB SEMI-PRIVATE ROOM DAILY

## 2024-03-25 PROCEDURE — 84445 ASSAY OF TSI GLOBULIN: CPT | Performed by: STUDENT IN AN ORGANIZED HEALTH CARE EDUCATION/TRAINING PROGRAM

## 2024-03-25 PROCEDURE — 87800 DETECT AGNT MULT DNA DIREC: CPT | Performed by: STUDENT IN AN ORGANIZED HEALTH CARE EDUCATION/TRAINING PROGRAM

## 2024-03-25 PROCEDURE — 99222 1ST HOSP IP/OBS MODERATE 55: CPT | Performed by: STUDENT IN AN ORGANIZED HEALTH CARE EDUCATION/TRAINING PROGRAM

## 2024-03-25 RX ORDER — POTASSIUM CHLORIDE 20 MEQ/1
40 TABLET, EXTENDED RELEASE ORAL ONCE
Status: COMPLETED | OUTPATIENT
Start: 2024-03-25 | End: 2024-03-25

## 2024-03-25 RX ORDER — METHIMAZOLE 10 MG/1
10 TABLET ORAL DAILY
Status: DISCONTINUED | OUTPATIENT
Start: 2024-03-25 | End: 2024-03-26

## 2024-03-25 RX ORDER — LANOLIN ALCOHOL/MO/W.PET/CERES
400 CREAM (GRAM) TOPICAL DAILY
Status: DISCONTINUED | OUTPATIENT
Start: 2024-03-25 | End: 2024-03-26 | Stop reason: HOSPADM

## 2024-03-25 RX ADMIN — KETOROLAC TROMETHAMINE 15 MG: 30 INJECTION, SOLUTION INTRAMUSCULAR; INTRAVENOUS at 13:04

## 2024-03-25 RX ADMIN — METHIMAZOLE 10 MG: 10 TABLET ORAL at 09:04

## 2024-03-25 RX ADMIN — IBUPROFEN 600 MG: 600 TABLET, FILM COATED ORAL at 18:50

## 2024-03-25 RX ADMIN — LIDOCAINE 1 PATCH: 4 PATCH TOPICAL at 18:50

## 2024-03-25 RX ADMIN — ACETAMINOPHEN 975 MG: 325 TABLET ORAL at 11:39

## 2024-03-25 RX ADMIN — ACETAMINOPHEN 975 MG: 325 TABLET ORAL at 18:41

## 2024-03-25 RX ADMIN — KETOROLAC TROMETHAMINE 15 MG: 30 INJECTION, SOLUTION INTRAMUSCULAR; INTRAVENOUS at 07:11

## 2024-03-25 RX ADMIN — ACETAMINOPHEN 975 MG: 325 TABLET ORAL at 05:04

## 2024-03-25 RX ADMIN — Medication 400 MG: at 09:04

## 2024-03-25 RX ADMIN — GENTAMICIN SULFATE 350 MG: 40 INJECTION, SOLUTION INTRAMUSCULAR; INTRAVENOUS at 09:04

## 2024-03-25 RX ADMIN — POTASSIUM CHLORIDE 40 MEQ: 1500 TABLET, EXTENDED RELEASE ORAL at 08:04

## 2024-03-25 RX ADMIN — AMPICILLIN SODIUM 2 G: 2 INJECTION, POWDER, FOR SOLUTION INTRAMUSCULAR; INTRAVENOUS at 07:11

## 2024-03-25 ASSESSMENT — PAIN SCALES - GENERAL
PAINLEVEL_OUTOF10: 6
PAINLEVEL_OUTOF10: 5 - MODERATE PAIN
PAINLEVEL_OUTOF10: 5 - MODERATE PAIN
PAINLEVEL_OUTOF10: 3
PAINLEVEL_OUTOF10: 7
PAINLEVEL_OUTOF10: 5 - MODERATE PAIN
PAINLEVEL_OUTOF10: 4
PAINLEVEL_OUTOF10: 7

## 2024-03-25 ASSESSMENT — PAIN - FUNCTIONAL ASSESSMENT: PAIN_FUNCTIONAL_ASSESSMENT: 0-10

## 2024-03-25 NOTE — PROGRESS NOTES
Spiritual Care Visit     Was both paged by staff from NICU & received Haiku from  to inform me of the situation for Kassie Apple and her son, Joshua.  I arrived on the unit to 31 and found Kassie peacefully holding Joshua.  She was very calm and welcoming.  We talked for a little bit about her situation.  Joshua is her sixth child, but this is the first time that she has not been aware of a pregnancy.  She said her background was Orthodox, no specific denomination.  I explained to her about how I generally did baptisms, and she was agreeable.  We prayed together and I baptized Joshua.  Spiritual care will continue to be available for his patient and family as needed.

## 2024-03-25 NOTE — PROGRESS NOTES
"Kassie is a 37yo  who delivered Baby Boy at home who initially had no pulse but was brought in by EMS and ROSC achieved after approx 1h of life (see 3/24/24 H&P and ED Provider Notes for more details). SW Intern, Chilo Austin, saw Kassie at bedside today to introduce self and provide support during this time.    Kassie stated that she is doing alright. She stated that the \"Lord does not put anything in [her] life that [she] cannot handle.\" She says she is a strong person as she has experienced previous losses young. Chilo validated these feelings. Discussed that given the situation, prayer is something that she would find beneficial. Chilo contacted Holli Randall with spiritual care. Holli to see Kassie today 3/25/24.     The NICU Team came to speak with Kassie so Chilo left and came back around an hour later. Kassie and Chilo also spoke about how being in the hospital is a stressful situation and can be boring at the same time. Chilo brought over some coloring pages and colored pencils. No additional needs identified at this time. Chilo left Luciana Wen/the Parent [facility]ment Program's card with her son's NICU nurse. Chilo stated that should she think of anything else that could be beneficial or supportive to her, to please let her nurse know and I would come see her again. Kassie expressed verbal understanding.    PLAN: Will continue to be available for bereavement support and services as needed.    24 at 4:34 PM - Chilo Austin    This note has been reviewed and approved by Chilo Austin's supervisor:  Luciana Wen, MSSA, LISW, CGP  Director of Parent Bereavement Programs    Please call [phone] or secure chat Luciana Wen with any questions or concerns.    "

## 2024-03-25 NOTE — PROGRESS NOTES
Postpartum Progress Note      Assessment/Plan     Kassie Apple is a 38 y.o.,  initially presented for delivery in field, infant with no pulse.  She had a Vaginal, Spontaneous   delivery on 3/24/24  at Unknown gestation and is now postpartum day 1.    #Suspected placental abruption  - dx based on gross inspection of placenta  - Unknown EBL @ home  - Admit Hg 11.3 -> PPD1 8.7  - Start 300mg IV iron sucrose daily while inpatient, max 3 doses   - repeat CBC in AM or sooner if becomes symptomatic    #Suspected IAI  - dx by chills (now resolved), foul odor, placental appearance upon delivery, and WBC: 24.7   - repeat WBC 9.7   - Continue amp/gent/clinda for 24hr afebrile    #Electrolyte disturbances  - Hypokalemia: K: 3.4 -> 40meq x1 --> repeat PPD2  - Hypomagnesemia: M.5 --> 400mg mag Ox --> repeat PPD2    #Hyperthyroidism  - Endo note from 2019 states Graves disease w history of diffuse goiter and thyrotoxicosis  - Was on methimazole 10mg daily (not taking), will restart  - T4 2.45, TSH <0.01   - TRAB, TSI pending  - endocrine consulted to notify of pt admission, appreciate recs    #NPNC  - Pt didn't know she was pregnant, thought bleeding episodes were menses  - PNL drawn on admission and significant for:  - Hepatitis B, surface antibody positive, surface antigen NEG c/w hx of infection; no action indicated @ this time   - thyroid labs, see above   - otherwise WNL  - SW consult    #Postpartum  - continue routine postpartum care  - pain well controlled on po medications  - DVT Score: 3 ; SCDs  - The patient's blood type is A POS. Rhogam is not indicated.    #Maternal Well-Being  - emotional support provided; baby currently being cooled in NICU  - NICU SW consult for access to resources  - Contraception: Depo-Provera    #[place] Feeding  - breastfeeding/pumping encouraged; lactation consult prn    #Dispo  - Discussed plan with Dr. Ginger Fay  - anticipate d/c on PPD #2-3 if continuing to meet all  postpartum milestones  - for f/u 4-6 weeks with Primary OB provider      Principal Problem:    Vaginal delivery  Active Problems:    Hyperthyroidism    Congenital pulmonary valve anomaly    Graves disease    Hypomagnesemia    Hypokalemia    Chorioamnionitis, delivered, current hospitalization       Subjective   Denies fever, chills, N/V, diaphoresis, uncontrolled abdominal pain.     Meeting all postpartum milestones- ambulating independently, passing flatus, tolerating PO intake, lochia light, voiding spontaneously, and pain well controlled with PO meds.    Objective   Physical Exam:  General:  thin, postpartum  Obstetric: fundus firm below umbilicus, lochia light  Skin: Warm, dry; no rashes/lesions/erythema  Breast: No masses, nipple discharge  Neuro: A/Ox3, no gross motor deficit   GI: no distension, appropriately tender, soft, +BS  Respiratory: Even and unlabored on RA   Cardiovascular: No BLE edema; No erythema, warmth  Psych: appropriate mood and affect, conversational    Last Vitals:  Temp Pulse Resp BP MAP Pulse Ox   36.4 °C (97.5 °F) 70 18 105/56   100 %       Vitals Min/Max Last 24 Hours:  Temp  Min: 36.2 °C (97.2 °F)  Max: 36.7 °C (98.1 °F)  Pulse  Min: 58  Max: 70  Resp  Min: 16  Max: 18  BP  Min: 92/45  Max: 124/80    Lab Data:  Lab Results   Component Value Date    WBC 9.7 03/25/2024    HGB 8.7 (L) 03/25/2024    HCT 25.7 (L) 03/25/2024     03/25/2024

## 2024-03-26 ENCOUNTER — DOCUMENTATION (OUTPATIENT)
Dept: CASE MANAGEMENT | Facility: HOSPITAL | Age: 39
End: 2024-03-26

## 2024-03-26 VITALS
TEMPERATURE: 97.2 F | SYSTOLIC BLOOD PRESSURE: 129 MMHG | RESPIRATION RATE: 16 BRPM | DIASTOLIC BLOOD PRESSURE: 78 MMHG | HEART RATE: 61 BPM | OXYGEN SATURATION: 98 % | BODY MASS INDEX: 24.33 KG/M2 | HEIGHT: 67 IN | WEIGHT: 155 LBS

## 2024-03-26 PROBLEM — E83.42 HYPOMAGNESEMIA: Status: RESOLVED | Noted: 2024-03-25 | Resolved: 2024-03-26

## 2024-03-26 PROBLEM — O41.1290 CHORIOAMNIONITIS, DELIVERED, CURRENT HOSPITALIZATION (HHS-HCC): Status: RESOLVED | Noted: 2024-03-25 | Resolved: 2024-03-26

## 2024-03-26 LAB
ALBUMIN SERPL BCP-MCNC: 2.3 G/DL (ref 3.4–5)
ALP SERPL-CCNC: 122 U/L (ref 33–120)
ALT SERPL W P-5'-P-CCNC: 11 U/L (ref 7–52)
ANION GAP SERPL CALC-SCNC: 10 MMOL/L (ref 10–20)
AST SERPL W P-5'-P-CCNC: 17 U/L (ref 9–39)
BILIRUB SERPL-MCNC: 0.1 MG/DL (ref 0–1.2)
BUN SERPL-MCNC: 6 MG/DL (ref 6–23)
C TRACH RRNA SPEC QL NAA+PROBE: NEGATIVE
CALCIUM SERPL-MCNC: 7.6 MG/DL (ref 8.6–10.6)
CHLORIDE SERPL-SCNC: 111 MMOL/L (ref 98–107)
CO2 SERPL-SCNC: 22 MMOL/L (ref 21–32)
CREAT SERPL-MCNC: 0.24 MG/DL (ref 0.5–1.3)
EGFRCR SERPLBLD CKD-EPI 2021: >90 ML/MIN/1.73M*2
ERYTHROCYTE [DISTWIDTH] IN BLOOD BY AUTOMATED COUNT: 14.4 % (ref 11.5–14.5)
GLUCOSE SERPL-MCNC: 77 MG/DL (ref 74–99)
HCT VFR BLD AUTO: 24.9 % (ref 36–52)
HGB BLD-MCNC: 8.5 G/DL (ref 12–17.5)
MAGNESIUM SERPL-MCNC: 1.64 MG/DL (ref 1.6–2.4)
MCH RBC QN AUTO: 28.9 PG (ref 26–34)
MCHC RBC AUTO-ENTMCNC: 34.1 G/DL (ref 32–36)
MCV RBC AUTO: 85 FL (ref 80–100)
N GONORRHOEA DNA SPEC QL PROBE+SIG AMP: NEGATIVE
NRBC BLD-RTO: 0 /100 WBCS (ref 0–0)
PLATELET # BLD AUTO: 181 X10*3/UL (ref 150–450)
POTASSIUM SERPL-SCNC: 3.4 MMOL/L (ref 3.5–5.3)
PROT SERPL-MCNC: 5.4 G/DL (ref 6.4–8.2)
RBC # BLD AUTO: 2.94 X10*6/UL (ref 4–5.9)
SODIUM SERPL-SCNC: 140 MMOL/L (ref 136–145)
T VAGINALIS RRNA SPEC QL NAA+PROBE: NEGATIVE
T3FREE SERPL-MCNC: 7.2 PG/ML (ref 2.3–4.2)
WBC # BLD AUTO: 6.7 X10*3/UL (ref 4.4–11.3)

## 2024-03-26 PROCEDURE — 2500000004 HC RX 250 GENERAL PHARMACY W/ HCPCS (ALT 636 FOR OP/ED): Performed by: NURSE PRACTITIONER

## 2024-03-26 PROCEDURE — 36415 COLL VENOUS BLD VENIPUNCTURE: CPT | Performed by: NURSE PRACTITIONER

## 2024-03-26 PROCEDURE — 85027 COMPLETE CBC AUTOMATED: CPT | Performed by: NURSE PRACTITIONER

## 2024-03-26 PROCEDURE — 80053 COMPREHEN METABOLIC PANEL: CPT | Performed by: NURSE PRACTITIONER

## 2024-03-26 PROCEDURE — 2500000001 HC RX 250 WO HCPCS SELF ADMINISTERED DRUGS (ALT 637 FOR MEDICARE OP): Performed by: STUDENT IN AN ORGANIZED HEALTH CARE EDUCATION/TRAINING PROGRAM

## 2024-03-26 PROCEDURE — 2500000002 HC RX 250 W HCPCS SELF ADMINISTERED DRUGS (ALT 637 FOR MEDICARE OP, ALT 636 FOR OP/ED): Performed by: NURSE PRACTITIONER

## 2024-03-26 PROCEDURE — 84481 FREE ASSAY (FT-3): CPT | Performed by: NURSE PRACTITIONER

## 2024-03-26 PROCEDURE — 2500000001 HC RX 250 WO HCPCS SELF ADMINISTERED DRUGS (ALT 637 FOR MEDICARE OP): Performed by: NURSE PRACTITIONER

## 2024-03-26 PROCEDURE — 0JHF3HZ INSERTION OF CONTRACEPTIVE DEVICE INTO LEFT UPPER ARM SUBCUTANEOUS TISSUE AND FASCIA, PERCUTANEOUS APPROACH: ICD-10-PCS | Performed by: NURSE PRACTITIONER

## 2024-03-26 PROCEDURE — 11981 INSERTION DRUG DLVR IMPLANT: CPT | Performed by: NURSE PRACTITIONER

## 2024-03-26 PROCEDURE — 2500000001 HC RX 250 WO HCPCS SELF ADMINISTERED DRUGS (ALT 637 FOR MEDICARE OP)

## 2024-03-26 PROCEDURE — 83735 ASSAY OF MAGNESIUM: CPT | Performed by: NURSE PRACTITIONER

## 2024-03-26 RX ORDER — FERROUS SULFATE 325(65) MG
65 TABLET ORAL
Qty: 30 TABLET | Refills: 1 | Status: SHIPPED | OUTPATIENT
Start: 2024-03-26 | End: 2024-05-25

## 2024-03-26 RX ORDER — METHIMAZOLE 10 MG/1
10 TABLET ORAL EVERY 12 HOURS
Status: DISCONTINUED | OUTPATIENT
Start: 2024-03-26 | End: 2024-03-26 | Stop reason: HOSPADM

## 2024-03-26 RX ORDER — METHIMAZOLE 10 MG/1
10 TABLET ORAL SEE ADMIN INSTRUCTIONS
Qty: 42 TABLET | Refills: 0 | Status: SHIPPED | OUTPATIENT
Start: 2024-03-26

## 2024-03-26 RX ORDER — CYCLOBENZAPRINE HCL 10 MG
5 TABLET ORAL 3 TIMES DAILY PRN
Status: DISCONTINUED | OUTPATIENT
Start: 2024-03-26 | End: 2024-03-26 | Stop reason: HOSPADM

## 2024-03-26 RX ORDER — LIDOCAINE HYDROCHLORIDE 10 MG/ML
3 INJECTION, SOLUTION EPIDURAL; INFILTRATION; INTRACAUDAL; PERINEURAL ONCE AS NEEDED
Status: DISCONTINUED | OUTPATIENT
Start: 2024-03-26 | End: 2024-03-26 | Stop reason: HOSPADM

## 2024-03-26 RX ORDER — POTASSIUM CHLORIDE 20 MEQ/1
40 TABLET, EXTENDED RELEASE ORAL EVERY 4 HOURS
Status: DISCONTINUED | OUTPATIENT
Start: 2024-03-26 | End: 2024-03-26 | Stop reason: HOSPADM

## 2024-03-26 RX ADMIN — POTASSIUM CHLORIDE 40 MEQ: 1500 TABLET, EXTENDED RELEASE ORAL at 15:21

## 2024-03-26 RX ADMIN — IRON SUCROSE 300 MG: 20 INJECTION, SOLUTION INTRAVENOUS at 01:04

## 2024-03-26 RX ADMIN — ETONOGESTREL 1 EACH: 68 IMPLANT SUBCUTANEOUS at 09:52

## 2024-03-26 RX ADMIN — IBUPROFEN 600 MG: 600 TABLET, FILM COATED ORAL at 01:03

## 2024-03-26 RX ADMIN — METHIMAZOLE 10 MG: 10 TABLET ORAL at 08:54

## 2024-03-26 RX ADMIN — CYCLOBENZAPRINE HYDROCHLORIDE 5 MG: 10 TABLET, FILM COATED ORAL at 03:40

## 2024-03-26 RX ADMIN — ACETAMINOPHEN 975 MG: 325 TABLET ORAL at 06:49

## 2024-03-26 RX ADMIN — IBUPROFEN 600 MG: 600 TABLET, FILM COATED ORAL at 12:58

## 2024-03-26 RX ADMIN — IBUPROFEN 600 MG: 600 TABLET, FILM COATED ORAL at 06:49

## 2024-03-26 RX ADMIN — ACETAMINOPHEN 975 MG: 325 TABLET ORAL at 12:58

## 2024-03-26 RX ADMIN — IRON SUCROSE 300 MG: 20 INJECTION, SOLUTION INTRAVENOUS at 08:54

## 2024-03-26 RX ADMIN — Medication 400 MG: at 08:54

## 2024-03-26 RX ADMIN — ACETAMINOPHEN 975 MG: 325 TABLET ORAL at 01:03

## 2024-03-26 ASSESSMENT — PAIN - FUNCTIONAL ASSESSMENT: PAIN_FUNCTIONAL_ASSESSMENT: 0-10

## 2024-03-26 ASSESSMENT — PAIN SCALES - GENERAL
PAINLEVEL_OUTOF10: 5 - MODERATE PAIN
PAINLEVEL_OUTOF10: 7
PAINLEVEL_OUTOF10: 5 - MODERATE PAIN

## 2024-03-26 ASSESSMENT — PAIN DESCRIPTION - DESCRIPTORS: DESCRIPTORS: CRAMPING

## 2024-03-26 ASSESSMENT — PAIN DESCRIPTION - LOCATION: LOCATION: ABDOMEN

## 2024-03-26 NOTE — PROCEDURES
Nexplanon Insertion    Date/Time: 3/26/2024 10:21 AM    Performed by: LUANN Srivastava  Authorized by: LUANN Srivastava    Consent:     Consent obtained:  Verbal and written    Consent given by:  Patient    Risks, benefits, and alternatives were discussed: yes      Risks discussed:  Bleeding, infection and pain    Alternatives discussed:  No treatment, delayed treatment and alternative treatment  Universal protocol:     Procedure explained and questions answered to patient or proxy's satisfaction: yes      Relevant documents present and verified: yes      Required blood products, implants, devices, and special equipment available: yes      Site/side marked: yes      Immediately prior to procedure, a time out was called: yes      Patient identity confirmed:  Verbally with patient  Indications:     Indications:  Request for subdermal LARC  Pre-procedure details:     Skin preparation:  Povidone-iodine  Sedation:     Sedation type:  None  Anesthesia:     Anesthesia method:  Local infiltration    Local anesthetic:  Lidocaine 1% w/o epi  Procedure specific details:      Nexplanon placement      Risks, benefits and alternatives were discussed with the patient. We discussed possible complications and risks, including infection, bleeding, pain, tingling, failure, migration of implant, increased risk of ectopic should pregnancy occur and inability to remove implant. Electronic consent was obtained prior to the procedure and is detailed in the patient's record.     Procedure Note: Patient placed in semi-fowlers position. 3 ml of 1% lidocaine was injected just under the skin at marked insertion site.  The skin was then prepped with betadine.   Using standard technique, the implant was inserted in the inner side of the patient's non-dominant (LEFT) upper arm. Insertion was confirmed by visual inspection of the tip of the needle and palpation of the patient's arm. No blood loss.  Post insertion  precautions/expectations were discussed with the patient.  LUANN Srivastava     Post-procedure details:     Procedure completion:  Tolerated well, no immediate complications

## 2024-03-26 NOTE — CARE PLAN
Problem: Postpartum  Goal: Experiences normal postpartum course  3/26/2024 1701 by Debbi Sosa RN  Outcome: Met  3/26/2024 1700 by Debbi Sosa RN  Outcome: Progressing  Goal: No s/sx infection  Outcome: Met  Goal: No s/sx of hemorrhage  Outcome: Met  Goal: Minimal s/sx of HDP and BP<160/110  Outcome: Met     Problem: Pain - Adult  Goal: Verbalizes/displays adequate comfort level or baseline comfort level  Outcome: Met     Problem: Safety - Adult  Goal: Free from fall injury  Outcome: Met     Problem: Discharge Planning  Goal: Discharge to home or other facility with appropriate resources  Outcome: Met     Problem: Chronic Conditions and Co-morbidities  Goal: Patient's chronic conditions and co-morbidity symptoms are monitored and maintained or improved  Outcome: Progressing   The patient's goals for the shift include rest    The clinical goals for the shift include patient to progressively return to the prepregnancy state    Over the shift, the patient did make progress toward the following goals. Barriers to progression include no improvement in Grave's disease. Recommendations to address these barriers include keep outpatient follow up appointments.

## 2024-03-26 NOTE — CONSULTS
Inpatient consult to Endocrinology  Consult performed by: Richard Guerrero MD  Consult ordered by: ARVIN Srivastava-CNP      Reason for consult:  Hyperthyroidism    HPI:  Ms. Apple is a 38 years old lady with known history of Graves' disease since at least  who presented to the hospital after spontaneous vaginal delivery at home on 3/24/2024.  She delivered the placenta in the hospital documentation findings are suggestive of placental abruption.  Of note patient was unaware of pregnancy and did not receive prenatal care.  Labs are consistent with hyperthyroidism.  Endocrinology is consulted to assist in management.    TFTs:   2020: TSH<0.01, FT4 5.33  3/20/2024: TSH<0.01, FT4 2.45    Home medications: Methimazole 10 mg daily.  Patient reports that she has not taken it for at least a month and a half.  She reports that she was getting prescription refills through her [facility] endocrinology provider Dr. Trotter but she needed to follow-up with him prior to medication getting refilled but she was having issues with insurance and could not schedule a follow-up appointment therefore she has not had any methimazole.  Per chart review however, does not appear the patient has seen endocrinology recently multiple no-shows per chart review.    TSI was elevated 8.8 in 3/2020 at an OSH    Thyroid US 2019:  IMPRESSION:  Prominent, diffusely heterogenous thyroid gland with increased  vascular flow, suggestive of nonspecific thyroiditis. Possible  nonspecific hypoechoic 17 mm nodule in the upper pole of the right  lobe.    Patient is endorsing night sweats, occasional palpitations, and generalized fatigue.  She is denying any eye symptoms such as itching, tearing, or eye discharge.  Interestingly patient is reporting that she had regular periods while pregnant.    Past Medical History  Graves' disease    Surgical History   section     Social History  Reports occasional smoking, denies EtOH use, denies illicit drug  use.  Works in a factory packaging products    Family History  Endorses strong family history of thyroid disease in siblings.  2 siblings with hyperthyroidism 1 sibling with hypothyroidism       ROS, PMH, FH/SH, surgical history and allergies have been reviewed.    10 pt ROS negative except as mentioned above     Visit Vitals  /78   Pulse 61   Temp 36.2 °C (97.2 °F) (Temporal)   Resp 16       Physical Exam  Constitutional: Young female, NAD, well groomed. AOx3. Cooperative  Skin/Hair: Warm, dry skin.  HEENT: EOMI, Anicteric scleras   Neck: Soft, supple, goiter, rubbery consistency  Cardiovascular: normal HR  Respiratory: no increased wob,  accessory muscle use.  Abdomen: Soft, nondistended  Extremities: No peripheral edema.  Neuro: Moving all extremities spontaneously    Assessment and plan  38-year-old female with known Graves' disease who presents after spontaneous vaginal delivery at home with unknown gestational age (patient was not aware of pregnancy and therefore was not receiving prenatal care, baby delivered at 5 pounds passed away today 3/26).  Labs on admission are consistent with hyperthyroidism.  Home dose of methimazole supposedly 10 mg daily however patient has not been taking for the past month and a half.    Patient reports that she gets all of her care through [facility].  She reports that her insurance issue is now resolved and she can arrange for her follow-up with her outpatient endocrinologist.    Recommendations:  - Methimazole 10 mg twice daily for 2 weeks, then decrease dose of methimazole to 10 mg once daily until follow-up with her outpatient endocrinologist  - She would need repeat TSH, free T4 in 4 weeks, with CMP and CBC (outpatient PCP and endocrinologist can follow)    Plan was communicated to the primary team    Richard Guerrero MD  Endocrinology, PGY 4  Pager [phone] or secure chat     Case seen, examined, and discussed with Dr. Marcial

## 2024-03-26 NOTE — PROGRESS NOTES
"Kassie is a 39yo  who delivered Baby Boy at home who initially had no pulse but was brought in by EMS and ROSC achieved after approx 1h of life (see 3/24/24 H&P and ED Provider Notes for more details). Baby Boy \"Joshua\" passed at 1941 on 3/25/24 after compassionate extubation and comfort care measures. SW Intern, Chilo Austin, saw Kassie at bedside today provide bereavement support.    When Chilo initially went to see Kassie around 9a, she was resting and asked if Chilo could come back in an hour. Chilo returned around 10:15a. Kassie stated that she was doing \"okay\" but was just \"tired.\" She is ready to be home with her other children who are around the ages of 15, 14 and 13yo. When asked if any resources like reading material would be helpful for her children, Kassie agreed. Chilo brought [facility], along with a grief coloring book, grief journal, and a pregnancy loss book for her children.     Chilo emphasized that we are here for her even when she leaves the hospital if she has any questions or can identify any areas where additional support may be needed. Kassie expressed verbal understanding. Provided Kassie with the Parent Bereavement Program/Luciana Wen's direct contact information along with the books.     PLAN: Will continue to be available for bereavement support and services as needed.      24 at 11:20 AM - Chilo Austin    This note has been reviewed and approved by Chilo Austin's supervisor:  Luciana Wen, MSSA, LISW, CGP  Director of Parent Bereavement Programs    Please call [phone] or secure chat Luciana Wen with any questions or concerns.    "

## 2024-03-26 NOTE — DISCHARGE SUMMARY
Discharge Summary    Admission Date: 3/24/2024  Discharge Date: 24  Discharge Diagnosis: Vaginal delivery     Patient Active Problem List   Diagnosis    Vaginal delivery    Family history of Downs syndrome    Hyperthyroidism    Congenital pulmonary valve anomaly    Graves disease    Hypokalemia       Hospital Course  Kassie Apple is a 38 y.o.,     Initially presented for: delivery of infant @ home    Admission Date: 3/24/2024    Delivery Date:  3/22/2024    Delivery type: Vaginal, Spontaneous      GA at delivery: Unknown    Outcome: Care withdrawn,     Anesthesia during delivery: None     Intrapartum complications: Chorioamnionitis ; placental abruption    Feeding method: Breastfeeding Status: No    Contraception: Nexplanon    Rhogam: The patient's blood type is A POS. Rhogam is not indicated.     Now postpartum day: 2.    Hospital course n/f:  #Suspected placental abruption  - dx based on gross inspection of placenta  - Unknown EBL @ home  - Admit Hg 11.3 -> PPD1 8.7 -> 8.5  - s/p 2 doses 300mg IV iron sucrose      #Suspected IAI  - dx by chills (now resolved), foul odor, placental appearance upon delivery, and WBC: 24.7   - repeat WBC 9.7   - s/p amp/gent/clinda for 24hr, VSS      #Electrolyte disturbances  - Hypokalemia: K: 3.4 -> 40meq x1 --> repeat same  - Hypomagnesemia: M.5 --> 400mg mag Ox --> repeat 1.64     #Hyperthyroidism  - Recommendations per endocrine team appreciated:  - Methimazole 10 mg twice daily for 2 weeks, then decrease dose of methimazole to 10 mg once daily until follow-up with her outpatient endocrinologist  - She would need repeat TSH, free T4 in 4 weeks, with CMP and CBC (outpatient PCP and endocrinologist can follow)    #gHTN  - Dx this AM by multiple mild range BP's > 4hr apart  - asx  - CBC/CMP unremarkable from [facility] labs perspective  - discussed dx and recommendation to stay for additional inpt monitoring.    I strongly advised pt to stay for continued  monitoring given multiple comorbidities. Pt unable to obtain sitter for other children. Requesting DC today. Given her baby passed away overnight, BP normotensive after dx of gHTN, and children w/o care @ home, OK for DC today using shared decision making.     Per pt, awaiting insurance approval for medications. Rxs sent to pharmacy on file. SW onboard.    PP course otherwise uneventful.  Ambulating independently, passing flatus, tolerating PO intake, lochia light, voiding spontaneously, and pain well controlled with PO meds.     Dispo  OK for DC today  - 1 mo f/up w endocrine @ [facility]  - 1wk BP check and home monitoring BID  - 6wk PPV    Pertinent Physical Exam At Time of Discharge  General: thin, borderline cachectic, eyes sunken, poor dentition w missing teeth  Obstetric: fundus firm below umbilicus, lochia light  Skin: Warm, dry; sallow, no rashes/lesions/erythema  Neuro: A/Ox3, no gross motor deficit   GI: no distension, appropriately tender, soft, +BS  Respiratory: Even and unlabored on RA, LSCTA BL  Cardiovascular: No BLE edema; No erythema, warmth  Psych: flat affect, interactive, pleasant       Your medication list        START taking these medications        Instructions Last Dose Given Next Dose Due   ferrous sulfate (325 mg ferrous sulfate) tablet      Take 1 tablet by mouth once daily with breakfast.              CHANGE how you take these medications        Instructions Last Dose Given Next Dose Due   methIMAzole 10 mg tablet  Commonly known as: Tapazole  What changed:   when to take this  additional instructions      Take 1 tablet (10 mg) by mouth see administration instructions. Take 1 tablet by mouth twice daily for 2 weeks, then daily for 2 weeks.                 Where to Get Your Medications        [id number] [place], [place] [facility] [address] [facility]  [address] [facility] [address]      Phone: [phone]   ferrous sulfate (325 mg ferrous sulfate)  tablet  methIMAzole 10 mg tablet           Outpatient Follow-Up  No future appointments.    Sharon Swift, APRN-CNP

## 2024-03-26 NOTE — PROGRESS NOTES
Spiritual Care Visit     Stopped to see Ms. Apple and offer my sympathy on the loss of her son, Joshua.  She was quiet but receptive to my visit.  She seemed to be distracting herself by organizing her belongings.  She said that she would be going home soon, hoping to be released by tomorrow.

## 2024-03-27 LAB — TSH RECEP AB SER-ACNC: <1.1 IU/L

## 2024-03-28 LAB
LABORATORY COMMENT REPORT: NORMAL
PATH REPORT.FINAL DX SPEC: NORMAL
PATH REPORT.GROSS SPEC: NORMAL
PATH REPORT.RELEVANT HX SPEC: NORMAL
PATH REPORT.TOTAL CANCER: NORMAL
TSI SER-ACNC: 1 TSI INDEX

## 2024-04-04 ENCOUNTER — DOCUMENTATION (OUTPATIENT)
Dept: CASE MANAGEMENT | Facility: HOSPITAL | Age: 39
End: 2024-04-04

## 2024-04-04 NOTE — PROGRESS NOTES
"Kassie is a 39yo  who delivered Baby Boy at home who initially had no pulse but was brought in by EMS and ROSC achieved after approx 1h of life (see 3/24/24 H&P and ED Provider Notes for more details). Baby Boy \"Joshua\" passed at 1941 on 3/25/24 after compassionate extubation and comfort care measures. SW Intern, Chilo Austin, called Kassie today to provide bereavement support follow-up.    Kassie's phone [phone] rang to . Chilo left a VM with the Parent Bereavement Program/Luciana Wen's direct contact information.     PLAN: Will continue to be available for bereavement support and services as needed.      24 at 11:18 AM - Chilo Austin    This note has been reviewed and approved by Chilo Austin's supervisor:  Luciana Wen, MSSJAYA, LISW, CGP  Director of Parent Bereavement Programs    Please call [phone] or secure chat Luciana Wen with any questions or concerns.    "